# Patient Record
Sex: MALE | Race: BLACK OR AFRICAN AMERICAN | URBAN - METROPOLITAN AREA
[De-identification: names, ages, dates, MRNs, and addresses within clinical notes are randomized per-mention and may not be internally consistent; named-entity substitution may affect disease eponyms.]

---
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
## 2025-02-13 ENCOUNTER — APPOINTMENT (OUTPATIENT)
Dept: PRIMARY CARE | Facility: CLINIC | Age: 20
End: 2025-02-13
Payer: MEDICAID

## 2025-02-25 ASSESSMENT — PROMIS GLOBAL HEALTH SCALE
CARRYOUT_SOCIAL_ACTIVITIES: EXCELLENT
RATE_SOCIAL_SATISFACTION: EXCELLENT
RATE_AVERAGE_PAIN: 0
RATE_PHYSICAL_HEALTH: VERY GOOD
EMOTIONAL_PROBLEMS: NEVER
RATE_QUALITY_OF_LIFE: EXCELLENT
RATE_MENTAL_HEALTH: VERY GOOD
RATE_GENERAL_HEALTH: GOOD
CARRYOUT_PHYSICAL_ACTIVITIES: COMPLETELY

## 2025-03-04 ENCOUNTER — APPOINTMENT (OUTPATIENT)
Dept: PRIMARY CARE | Facility: CLINIC | Age: 20
End: 2025-03-04
Payer: MEDICAID

## 2025-03-04 VITALS
OXYGEN SATURATION: 94 % | SYSTOLIC BLOOD PRESSURE: 124 MMHG | HEART RATE: 74 BPM | BODY MASS INDEX: 38.88 KG/M2 | WEIGHT: 262.5 LBS | DIASTOLIC BLOOD PRESSURE: 78 MMHG | HEIGHT: 69 IN | TEMPERATURE: 97.2 F

## 2025-03-04 DIAGNOSIS — A64 STD (MALE): ICD-10-CM

## 2025-03-04 DIAGNOSIS — Z00.00 HEALTHCARE MAINTENANCE: Primary | ICD-10-CM

## 2025-03-04 LAB
APPEARANCE UR: CLEAR
BILIRUB UR QL STRIP: ABNORMAL
COLOR UR: YELLOW
GLUCOSE UR STRIP-MCNC: NEGATIVE MG/DL
HGB UR QL STRIP: NEGATIVE
KETONES UR STRIP-MCNC: ABNORMAL MG/DL
LEUKOCYTE ESTERASE UR QL STRIP: NEGATIVE
NITRITE UR QL STRIP: NEGATIVE
PH UR STRIP: 6 [PH]
PROT UR STRIP-MCNC: ABNORMAL MG/DL
SP GR UR STRIP.AUTO: 1.02
UROBILINOGEN UR STRIP-ACNC: 2 E.U./DL

## 2025-03-04 PROCEDURE — 99395 PREV VISIT EST AGE 18-39: CPT | Performed by: INTERNAL MEDICINE

## 2025-03-04 PROCEDURE — 1036F TOBACCO NON-USER: CPT | Performed by: INTERNAL MEDICINE

## 2025-03-04 PROCEDURE — 3008F BODY MASS INDEX DOCD: CPT | Performed by: INTERNAL MEDICINE

## 2025-03-04 PROCEDURE — 81003 URINALYSIS AUTO W/O SCOPE: CPT | Performed by: INTERNAL MEDICINE

## 2025-03-04 ASSESSMENT — ENCOUNTER SYMPTOMS
DEPRESSION: 0
LOSS OF SENSATION IN FEET: 0
OCCASIONAL FEELINGS OF UNSTEADINESS: 0

## 2025-03-04 ASSESSMENT — PATIENT HEALTH QUESTIONNAIRE - PHQ9
2. FEELING DOWN, DEPRESSED OR HOPELESS: NOT AT ALL
SUM OF ALL RESPONSES TO PHQ9 QUESTIONS 1 AND 2: 0
1. LITTLE INTEREST OR PLEASURE IN DOING THINGS: NOT AT ALL

## 2025-03-04 NOTE — PROGRESS NOTES
"Subjective   Patient ID: Juan Miguel Benites is a 19 y.o. male who presents for Annual Exam.    HPI   Age related wellness exam.  19 years old male comes in to see me today for age-related wellness exam.  He is requesting to be checked for STD.  No known allergies.  No past medical or surgical history.  Does not take any prescription medications.  Non-smoker no alcohol intake.  Private ST NA.  Single no children.  Mother father 1 brother and 2 sisters in good health  Review of Systems  12 system review 12 system negative.  Overweight 262.5 pounds.  Objective   /78 (BP Location: Right arm, Patient Position: Sitting, BP Cuff Size: Large adult)   Pulse 74   Temp 36.2 °C (97.2 °F) (Temporal)   Ht 1.74 m (5' 8.5\")   Wt 119 kg (262 lb 8 oz)   SpO2 94%   BMI 39.33 kg/m²     Physical Exam  Alert oriented in no distress nonicteric sclera no jaundice.  Face symmetrical cranial nerves intact.  Neck supple no masses no lymph no thyromegaly or jugular venous distention.  Lungs clear no rales wheezing or crackles.  Heart normal S1 and S2 regular rhythm.  Abdomen benign nontender no masses no organomegaly.  No pain on palpations and no murmur.  Neurologically intact equal strength in the upper and in the lower extremities.  No sensory deficit.  Normal speech.  Skin intact.  Check for STD.  Assessment/Plan   Diagnoses and all orders for this visit:  Healthcare maintenance  -     POCT UA (Automated) docked device  STD (male)  -     Hepatitis B surface antibody  -     HIV 1/2 Antigen/Antibody Screen with Reflex to Confirmation  -     Syphilis Screen with Reflex  -     Trichomonas vaginalis, Amplified  Other orders  -     POCT URINALYSIS AUTOMATED         "

## 2025-03-05 LAB — T VAGINALIS RRNA SPEC QL NAA+PROBE: NOT DETECTED

## 2025-03-06 LAB
HBV SURFACE AB SERPL IA-ACNC: 6 MIU/ML
HIV 1+2 AB+HIV1 P24 AG SERPL QL IA: NORMAL
T PALLIDUM AB SER QL IA: NEGATIVE

## 2025-03-07 ENCOUNTER — TELEPHONE (OUTPATIENT)
Dept: PRIMARY CARE | Facility: CLINIC | Age: 20
End: 2025-03-07
Payer: MEDICAID

## 2025-03-07 NOTE — TELEPHONE ENCOUNTER
----- Message from Khanh Rodriguez sent at 3/6/2025  6:40 PM EST -----  Regarding: r  All his STD results are negative including HIV, syphilis, trichomonas etc. etc. all negative.  His urine test looks normal